# Patient Record
Sex: FEMALE | Race: WHITE | NOT HISPANIC OR LATINO
[De-identification: names, ages, dates, MRNs, and addresses within clinical notes are randomized per-mention and may not be internally consistent; named-entity substitution may affect disease eponyms.]

---

## 2019-03-17 ENCOUNTER — TRANSCRIPTION ENCOUNTER (OUTPATIENT)
Age: 54
End: 2019-03-17

## 2020-10-06 ENCOUNTER — TRANSCRIPTION ENCOUNTER (OUTPATIENT)
Age: 55
End: 2020-10-06

## 2021-02-18 ENCOUNTER — TRANSCRIPTION ENCOUNTER (OUTPATIENT)
Age: 56
End: 2021-02-18

## 2021-02-19 ENCOUNTER — TRANSCRIPTION ENCOUNTER (OUTPATIENT)
Age: 56
End: 2021-02-19

## 2021-03-28 ENCOUNTER — APPOINTMENT (OUTPATIENT)
Dept: DISASTER EMERGENCY | Facility: OTHER | Age: 56
End: 2021-03-28
Payer: COMMERCIAL

## 2021-03-28 PROCEDURE — 0011A: CPT

## 2021-04-25 ENCOUNTER — APPOINTMENT (OUTPATIENT)
Dept: DISASTER EMERGENCY | Facility: OTHER | Age: 56
End: 2021-04-25
Payer: COMMERCIAL

## 2021-04-25 PROCEDURE — 0012A: CPT

## 2022-03-15 ENCOUNTER — OUTPATIENT (OUTPATIENT)
Dept: OUTPATIENT SERVICES | Facility: HOSPITAL | Age: 57
LOS: 1 days | End: 2022-03-15

## 2022-03-23 DIAGNOSIS — M53.3 SACROCOCCYGEAL DISORDERS, NOT ELSEWHERE CLASSIFIED: ICD-10-CM

## 2022-03-23 DIAGNOSIS — I10 ESSENTIAL (PRIMARY) HYPERTENSION: ICD-10-CM

## 2023-02-27 ENCOUNTER — NON-APPOINTMENT (OUTPATIENT)
Age: 58
End: 2023-02-27

## 2023-05-29 ENCOUNTER — NON-APPOINTMENT (OUTPATIENT)
Age: 58
End: 2023-05-29

## 2023-10-16 ENCOUNTER — EMERGENCY (EMERGENCY)
Facility: HOSPITAL | Age: 58
LOS: 1 days | Discharge: HOME | End: 2023-10-16
Attending: EMERGENCY MEDICINE
Payer: COMMERCIAL

## 2023-10-16 VITALS
RESPIRATION RATE: 14 BRPM | DIASTOLIC BLOOD PRESSURE: 98 MMHG | WEIGHT: 139.77 LBS | OXYGEN SATURATION: 99 % | TEMPERATURE: 98 F | HEART RATE: 94 BPM | SYSTOLIC BLOOD PRESSURE: 164 MMHG

## 2023-10-16 LAB
ALBUMIN SERPL ELPH-MCNC: 4.4 G/DL — SIGNIFICANT CHANGE UP (ref 3.3–5.2)
ALBUMIN SERPL ELPH-MCNC: 4.4 G/DL — SIGNIFICANT CHANGE UP (ref 3.3–5.2)
ALP SERPL-CCNC: 57 U/L — SIGNIFICANT CHANGE UP (ref 40–120)
ALP SERPL-CCNC: 57 U/L — SIGNIFICANT CHANGE UP (ref 40–120)
ALT FLD-CCNC: 17 U/L — SIGNIFICANT CHANGE UP
ALT FLD-CCNC: 17 U/L — SIGNIFICANT CHANGE UP
ANION GAP SERPL CALC-SCNC: 13 MMOL/L — SIGNIFICANT CHANGE UP (ref 5–17)
ANION GAP SERPL CALC-SCNC: 13 MMOL/L — SIGNIFICANT CHANGE UP (ref 5–17)
AST SERPL-CCNC: 38 U/L — HIGH
AST SERPL-CCNC: 38 U/L — HIGH
BASOPHILS # BLD AUTO: 0.05 K/UL — SIGNIFICANT CHANGE UP (ref 0–0.2)
BASOPHILS # BLD AUTO: 0.05 K/UL — SIGNIFICANT CHANGE UP (ref 0–0.2)
BASOPHILS NFR BLD AUTO: 0.4 % — SIGNIFICANT CHANGE UP (ref 0–2)
BASOPHILS NFR BLD AUTO: 0.4 % — SIGNIFICANT CHANGE UP (ref 0–2)
BILIRUB SERPL-MCNC: 0.6 MG/DL — SIGNIFICANT CHANGE UP (ref 0.4–2)
BILIRUB SERPL-MCNC: 0.6 MG/DL — SIGNIFICANT CHANGE UP (ref 0.4–2)
BUN SERPL-MCNC: 8 MG/DL — SIGNIFICANT CHANGE UP (ref 8–20)
BUN SERPL-MCNC: 8 MG/DL — SIGNIFICANT CHANGE UP (ref 8–20)
CALCIUM SERPL-MCNC: 9.2 MG/DL — SIGNIFICANT CHANGE UP (ref 8.4–10.5)
CALCIUM SERPL-MCNC: 9.2 MG/DL — SIGNIFICANT CHANGE UP (ref 8.4–10.5)
CHLORIDE SERPL-SCNC: 102 MMOL/L — SIGNIFICANT CHANGE UP (ref 96–108)
CHLORIDE SERPL-SCNC: 102 MMOL/L — SIGNIFICANT CHANGE UP (ref 96–108)
CO2 SERPL-SCNC: 23 MMOL/L — SIGNIFICANT CHANGE UP (ref 22–29)
CO2 SERPL-SCNC: 23 MMOL/L — SIGNIFICANT CHANGE UP (ref 22–29)
CREAT SERPL-MCNC: 0.8 MG/DL — SIGNIFICANT CHANGE UP (ref 0.5–1.3)
CREAT SERPL-MCNC: 0.8 MG/DL — SIGNIFICANT CHANGE UP (ref 0.5–1.3)
EGFR: 85 ML/MIN/1.73M2 — SIGNIFICANT CHANGE UP
EGFR: 85 ML/MIN/1.73M2 — SIGNIFICANT CHANGE UP
EOSINOPHIL # BLD AUTO: 0.06 K/UL — SIGNIFICANT CHANGE UP (ref 0–0.5)
EOSINOPHIL # BLD AUTO: 0.06 K/UL — SIGNIFICANT CHANGE UP (ref 0–0.5)
EOSINOPHIL NFR BLD AUTO: 0.5 % — SIGNIFICANT CHANGE UP (ref 0–6)
EOSINOPHIL NFR BLD AUTO: 0.5 % — SIGNIFICANT CHANGE UP (ref 0–6)
GLUCOSE SERPL-MCNC: 93 MG/DL — SIGNIFICANT CHANGE UP (ref 70–99)
GLUCOSE SERPL-MCNC: 93 MG/DL — SIGNIFICANT CHANGE UP (ref 70–99)
HCT VFR BLD CALC: 40.9 % — SIGNIFICANT CHANGE UP (ref 34.5–45)
HCT VFR BLD CALC: 40.9 % — SIGNIFICANT CHANGE UP (ref 34.5–45)
HGB BLD-MCNC: 13.3 G/DL — SIGNIFICANT CHANGE UP (ref 11.5–15.5)
HGB BLD-MCNC: 13.3 G/DL — SIGNIFICANT CHANGE UP (ref 11.5–15.5)
IMM GRANULOCYTES NFR BLD AUTO: 0.3 % — SIGNIFICANT CHANGE UP (ref 0–0.9)
IMM GRANULOCYTES NFR BLD AUTO: 0.3 % — SIGNIFICANT CHANGE UP (ref 0–0.9)
LYMPHOCYTES # BLD AUTO: 1.89 K/UL — SIGNIFICANT CHANGE UP (ref 1–3.3)
LYMPHOCYTES # BLD AUTO: 1.89 K/UL — SIGNIFICANT CHANGE UP (ref 1–3.3)
LYMPHOCYTES # BLD AUTO: 14.2 % — SIGNIFICANT CHANGE UP (ref 13–44)
LYMPHOCYTES # BLD AUTO: 14.2 % — SIGNIFICANT CHANGE UP (ref 13–44)
MCHC RBC-ENTMCNC: 29.6 PG — SIGNIFICANT CHANGE UP (ref 27–34)
MCHC RBC-ENTMCNC: 29.6 PG — SIGNIFICANT CHANGE UP (ref 27–34)
MCHC RBC-ENTMCNC: 32.5 GM/DL — SIGNIFICANT CHANGE UP (ref 32–36)
MCHC RBC-ENTMCNC: 32.5 GM/DL — SIGNIFICANT CHANGE UP (ref 32–36)
MCV RBC AUTO: 91.1 FL — SIGNIFICANT CHANGE UP (ref 80–100)
MCV RBC AUTO: 91.1 FL — SIGNIFICANT CHANGE UP (ref 80–100)
MONOCYTES # BLD AUTO: 0.8 K/UL — SIGNIFICANT CHANGE UP (ref 0–0.9)
MONOCYTES # BLD AUTO: 0.8 K/UL — SIGNIFICANT CHANGE UP (ref 0–0.9)
MONOCYTES NFR BLD AUTO: 6 % — SIGNIFICANT CHANGE UP (ref 2–14)
MONOCYTES NFR BLD AUTO: 6 % — SIGNIFICANT CHANGE UP (ref 2–14)
NEUTROPHILS # BLD AUTO: 10.45 K/UL — HIGH (ref 1.8–7.4)
NEUTROPHILS # BLD AUTO: 10.45 K/UL — HIGH (ref 1.8–7.4)
NEUTROPHILS NFR BLD AUTO: 78.6 % — HIGH (ref 43–77)
NEUTROPHILS NFR BLD AUTO: 78.6 % — HIGH (ref 43–77)
PLATELET # BLD AUTO: 330 K/UL — SIGNIFICANT CHANGE UP (ref 150–400)
PLATELET # BLD AUTO: 330 K/UL — SIGNIFICANT CHANGE UP (ref 150–400)
POTASSIUM SERPL-MCNC: 4.7 MMOL/L — SIGNIFICANT CHANGE UP (ref 3.5–5.3)
POTASSIUM SERPL-MCNC: 4.7 MMOL/L — SIGNIFICANT CHANGE UP (ref 3.5–5.3)
POTASSIUM SERPL-SCNC: 4.7 MMOL/L — SIGNIFICANT CHANGE UP (ref 3.5–5.3)
POTASSIUM SERPL-SCNC: 4.7 MMOL/L — SIGNIFICANT CHANGE UP (ref 3.5–5.3)
PROT SERPL-MCNC: 7.3 G/DL — SIGNIFICANT CHANGE UP (ref 6.6–8.7)
PROT SERPL-MCNC: 7.3 G/DL — SIGNIFICANT CHANGE UP (ref 6.6–8.7)
RBC # BLD: 4.49 M/UL — SIGNIFICANT CHANGE UP (ref 3.8–5.2)
RBC # BLD: 4.49 M/UL — SIGNIFICANT CHANGE UP (ref 3.8–5.2)
RBC # FLD: 12.5 % — SIGNIFICANT CHANGE UP (ref 10.3–14.5)
RBC # FLD: 12.5 % — SIGNIFICANT CHANGE UP (ref 10.3–14.5)
SODIUM SERPL-SCNC: 138 MMOL/L — SIGNIFICANT CHANGE UP (ref 135–145)
SODIUM SERPL-SCNC: 138 MMOL/L — SIGNIFICANT CHANGE UP (ref 135–145)
WBC # BLD: 13.29 K/UL — HIGH (ref 3.8–10.5)
WBC # BLD: 13.29 K/UL — HIGH (ref 3.8–10.5)
WBC # FLD AUTO: 13.29 K/UL — HIGH (ref 3.8–10.5)
WBC # FLD AUTO: 13.29 K/UL — HIGH (ref 3.8–10.5)

## 2023-10-16 PROCEDURE — 74177 CT ABD & PELVIS W/CONTRAST: CPT | Mod: MA

## 2023-10-16 PROCEDURE — 99285 EMERGENCY DEPT VISIT HI MDM: CPT

## 2023-10-16 PROCEDURE — 74177 CT ABD & PELVIS W/CONTRAST: CPT | Mod: 26,MA

## 2023-10-16 PROCEDURE — 96374 THER/PROPH/DIAG INJ IV PUSH: CPT | Mod: XU

## 2023-10-16 PROCEDURE — 99284 EMERGENCY DEPT VISIT MOD MDM: CPT | Mod: 25

## 2023-10-16 PROCEDURE — 96375 TX/PRO/DX INJ NEW DRUG ADDON: CPT

## 2023-10-16 PROCEDURE — 80053 COMPREHEN METABOLIC PANEL: CPT

## 2023-10-16 PROCEDURE — 36415 COLL VENOUS BLD VENIPUNCTURE: CPT

## 2023-10-16 PROCEDURE — 85025 COMPLETE CBC W/AUTO DIFF WBC: CPT

## 2023-10-16 RX ORDER — HYDROMORPHONE HYDROCHLORIDE 2 MG/ML
1 INJECTION INTRAMUSCULAR; INTRAVENOUS; SUBCUTANEOUS ONCE
Refills: 0 | Status: DISCONTINUED | OUTPATIENT
Start: 2023-10-16 | End: 2023-10-16

## 2023-10-16 RX ORDER — CEFTRIAXONE 500 MG/1
1000 INJECTION, POWDER, FOR SOLUTION INTRAMUSCULAR; INTRAVENOUS ONCE
Refills: 0 | Status: COMPLETED | OUTPATIENT
Start: 2023-10-16 | End: 2023-10-16

## 2023-10-16 RX ORDER — ONDANSETRON 8 MG/1
4 TABLET, FILM COATED ORAL ONCE
Refills: 0 | Status: COMPLETED | OUTPATIENT
Start: 2023-10-16 | End: 2023-10-16

## 2023-10-16 RX ORDER — CEFTRIAXONE 500 MG/1
1000 INJECTION, POWDER, FOR SOLUTION INTRAMUSCULAR; INTRAVENOUS ONCE
Refills: 0 | Status: DISCONTINUED | OUTPATIENT
Start: 2023-10-16 | End: 2023-10-16

## 2023-10-16 RX ORDER — SODIUM CHLORIDE 9 MG/ML
1000 INJECTION INTRAMUSCULAR; INTRAVENOUS; SUBCUTANEOUS ONCE
Refills: 0 | Status: COMPLETED | OUTPATIENT
Start: 2023-10-16 | End: 2023-10-16

## 2023-10-16 RX ADMIN — SODIUM CHLORIDE 1000 MILLILITER(S): 9 INJECTION INTRAMUSCULAR; INTRAVENOUS; SUBCUTANEOUS at 20:49

## 2023-10-16 RX ADMIN — HYDROMORPHONE HYDROCHLORIDE 1 MILLIGRAM(S): 2 INJECTION INTRAMUSCULAR; INTRAVENOUS; SUBCUTANEOUS at 20:48

## 2023-10-16 RX ADMIN — CEFTRIAXONE 1000 MILLIGRAM(S): 500 INJECTION, POWDER, FOR SOLUTION INTRAMUSCULAR; INTRAVENOUS at 20:49

## 2023-10-16 RX ADMIN — ONDANSETRON 4 MILLIGRAM(S): 8 TABLET, FILM COATED ORAL at 20:48

## 2023-10-16 NOTE — ED ADULT TRIAGE NOTE - DIRECT TO ROOM CARE INITIATED:
Pt upset about the plan of care and why we are doing and IV and insulin. Explained the why to patient, who allowed me to attempt and IV one. After the failed attempt, requesting to speak with MD because she did not present because of her blood sugars and would like to only be checked for her possible UTI only.  Informed MD.     Arabella Finley RN  09/05/23 [id number] 16-Oct-2023 18:29

## 2023-10-16 NOTE — ED ADULT NURSE REASSESSMENT NOTE - NS ED NURSE REASSESS COMMENT FT1
Report received from EDWARD Russell.  Patient awaiting CT.  VS stable, respirations even and unlabored.  Will continue to monitor.

## 2023-10-16 NOTE — ED ADULT NURSE NOTE - OBJECTIVE STATEMENT
pt received in [facility]. Patient A&Ox4 complaining of l sided abdominal pain x 1 day.  PMHx of diverticulitis and states this feels the same. Denies N/V/D, fever, chills. NAD noted, respirations even and unlabored.  Safety precautions in place.  Plan of care explained, pt verbalized understanding.

## 2023-10-16 NOTE — ED STATDOCS - OBJECTIVE STATEMENT
57 y/o female with PMHx of diverticulitis, spinal fusion, hysterectomy presents to the ED c/o 1 day of LLQ abdominal pain which she states feels similar to previous episodes of diverticulitis in the past. Pt has not yet had BM today but does not feel constipated.   Denies f/c/n/v/cp/sob/palpitations/cough/rash/headache/dizziness/d/c/dysuria/hematuria    GI: Suparijessi 57 y/o female with PMHx of diverticulitis, spinal fusion, hysterectomy presents to the ED c/o 1 day of LLQ abdominal pain which she states feels similar to previous episodes of diverticulitis in the past. Pt has not yet had BM today but does not feel constipated.   Denies f/c/n/v/cp/sob/palpitations/cough/rash/headache/dizziness/d/c/dysuria/hematuria

## 2023-10-16 NOTE — ED STATDOCS - CLINICAL SUMMARY MEDICAL DECISION MAKING FREE TEXT BOX
59 y/o female with PMHx of diverticulitis presents to the ED c/o 1 day of LLQ abdominal pain which she states feels similar to previous episodes of diverticulitis in the past. Pt with most likely diverticulitis, will check labs, CT, pain control 57 y/o female with PMHx of diverticulitis presents to the ED c/o 1 day of LLQ abdominal pain which she states feels similar to previous episodes of diverticulitis in the past. Pt with most likely diverticulitis, will check labs, CT, pain control    JORGE A Nevarez 0111: CT with uncomplicated diverticulitis. Aware of adnexal cyst. Patient had Cipro/Flagyl sent to pharmacy by outpatient MD, but would rather take Augmentin. Medically stable for discharge. 59 y/o female with PMHx of diverticulitis presents to the ED c/o 1 day of LLQ abdominal pain which she states feels similar to previous episodes of diverticulitis in the past. started on cipro anf lagyl by her gi doctor but has not started the medications/picked them up yet. Pt with most likely diverticulitis, will check labs, CT, pain control    JORGE A Nevarez 0111: CT with uncomplicated diverticulitis. Aware of adnexal cyst. Patient had Cipro/Flagyl sent to pharmacy by outpatient MD, but would rather take Augmentin. Medically stable for discharge.

## 2023-10-16 NOTE — ED STATDOCS - NSFOLLOWUPINSTRUCTIONS_ED_ALL_ED_FT
- Prescription sent to pharmacy.    - Recommend over the counter Probiotic.   - Ibuprofen 600mg every 6 hours as needed for pain.  - Acetaminophen 650mg every 6 hours as needed for pain.   - Please call today to schedule follow up appointment with your gastroenterologist.   - Please bring all documentation from your ED visit to any related future follow up appointment.  - Please call to schedule follow up appointment with your primary care physician within 24-48 hours.  - Please seek immediate medical attention or return to the ED for any new/worsening, signs/symptoms, or concerns.    Feel better!       Diverticulitis       Diverticulitis is infection or inflammation of small pouches (diverticula) in the colon that form due to a condition called diverticulosis. Diverticula can trap stool (feces) and bacteria, causing infection and inflammation.    Diverticulitis may cause severe stomach pain and diarrhea. It may lead to tissue damage in the colon that causes bleeding or blockage. The diverticula may also burst (rupture) and cause infected stool to enter other areas of the abdomen.    What are the causes?  This condition is caused by stool becoming trapped in the diverticula, which allows bacteria to grow in the diverticula. This leads to inflammation and infection.    What increases the risk?  You are more likely to develop this condition if you have diverticulosis. The risk increases if you:    Are overweight or obese.  Do not get enough exercise.  Drink alcohol.  Use tobacco products.  Eat a diet that has a lot of red meat such as beef, pork, or lamb.  Eat a diet that does not include enough fiber. High-fiber foods include fruits, vegetables, beans, nuts, and whole grains.  Are over 40 years of age.    What are the signs or symptoms?  Symptoms of this condition may include:    Pain and tenderness in the abdomen. The pain is normally located on the left side of the abdomen, but it may occur in other areas.  Fever and chills.  Nausea.  Vomiting.  Cramping.  Bloating.  Changes in bowel routines.  Blood in your stool.    How is this diagnosed?  This condition is diagnosed based on:    Your medical history.  A physical exam.  Tests to make sure there is nothing else causing your condition. These tests may include:    Blood tests.  Urine tests.  CT scan of the abdomen.    How is this treated?  Most cases of this condition are mild and can be treated at home. Treatment may include:    Taking over-the-counter pain medicines.  Following a clear liquid diet.  Taking antibiotic medicines by mouth.  Resting.    More severe cases may need to be treated at a hospital. Treatment may include:    Not eating or drinking.  Taking prescription pain medicine.  Receiving antibiotic medicines through an IV.  Receiving fluids and nutrition through an IV.  Surgery.    When your condition is under control, your health care provider may recommend that you have a colonoscopy. This is an exam to look at the entire large intestine. During the exam, a lubricated, bendable tube is inserted into the anus and then passed into the rectum, colon, and other parts of the large intestine. A colonoscopy can show how severe your diverticula are and whether something else may be causing your symptoms.    Follow these instructions at home:      Medicines    Take over-the-counter and prescription medicines only as told by your health care provider. These include fiber supplements, probiotics, and stool softeners.  If you were prescribed an antibiotic medicine, take it as told by your health care provider. Do not stop taking the antibiotic even if you start to feel better.  Ask your health care provider if the medicine prescribed to you requires you to avoid driving or using machinery.        Eating and drinking     Follow a full liquid diet or another diet as directed by your health care provider.  After your symptoms improve, your health care provider may tell you to change your diet. He or she may recommend that you eat a diet that contains at least 25 grams (25 g) of fiber daily. Fiber makes it easier to pass stool. Healthy sources of fiber include:    Berries. One cup contains 4–8 grams of fiber.  Beans or lentils. One-half cup contains 5–8 grams of fiber.  Green vegetables. One cup contains 4 grams of fiber.  Avoid eating red meat.        General instructions    Do not use any products that contain nicotine or tobacco, such as cigarettes, e-cigarettes, and chewing tobacco. If you need help quitting, ask your health care provider.  Exercise for at least 30 minutes, 3 times each week. You should exercise hard enough to raise your heart rate and break a sweat.  Keep all follow-up visits as told by your health care provider. This is important. You may need to have a colonoscopy.    Contact a health care provider if:  Your pain does not improve.  Your bowel movements do not return to normal.    Get help right away if:  Your pain gets worse.  Your symptoms do not get better with treatment.  Your symptoms suddenly get worse.  You have a fever.  You vomit more than one time.  You have stools that are bloody, black, or tarry.    Summary  Diverticulitis is infection or inflammation of small pouches (diverticula) in the colon that form due to a condition called diverticulosis. Diverticula can trap stool (feces) and bacteria, causing infection and inflammation.  You are at higher risk for this condition if you have diverticulosis and you eat a diet that does not include enough fiber.  Most cases of this condition are mild and can be treated at home. More severe cases may need to be treated at a hospital.  When your condition is under control, your health care provider may recommend that you have an exam called a colonoscopy. This exam can show how severe your diverticula are and whether something else may be causing your symptoms.  Keep all follow-up visits as told by your health care provider. This is important.    ADDITIONAL NOTES AND INSTRUCTIONS    Please follow up with your Primary MD in 24-48 hr.  Seek immediate medical care for any new/worsening signs or symptoms.

## 2023-10-16 NOTE — ED STATDOCS - PATIENT PORTAL LINK FT
You can access the FollowMyHealth Patient Portal offered by [facility] by registering at the following website: [email or web address]. By joining [employer]’s FollowMyHealth portal, you will also be able to view your health information using other applications (apps) compatible with our system.

## 2023-10-16 NOTE — ED STATDOCS - PROGRESS NOTE DETAILS
JORGE A Nevarez: CT with uncomplicated diverticulitis. Aware of adnexal cyst. Patient had Cipro/Flagyl sent to pharmacy by outpatient MD, but would rather take Augmentin. Medically stable for discharge. JORGE A Nevarez: Patient evaluated by intake physician. HPI/ROS/PE as noted above. Will follow up plan per intake physician and continue to assess patient.

## 2023-10-16 NOTE — ED ADULT NURSE NOTE - NSFALLUNIVINTERV_ED_ALL_ED
Bed/Stretcher in lowest position, wheels locked, appropriate side rails in place/Call bell, personal items and telephone in reach/Instruct patient to call for assistance before getting out of bed/chair/stretcher/Non-slip footwear applied when patient is off stretcher/[place] to call system/Physically safe environment - no spills, clutter or unnecessary equipment/Purposeful proactive rounding/Room/bathroom lighting operational, light cord in reach

## 2023-10-17 RX ORDER — IBUPROFEN 200 MG
1 TABLET ORAL
Qty: 28 | Refills: 0
Start: 2023-10-17 | End: 2023-10-23

## 2024-02-20 PROBLEM — Z00.00 ENCOUNTER FOR PREVENTIVE HEALTH EXAMINATION: Status: ACTIVE | Noted: 2024-02-20

## 2024-02-21 ENCOUNTER — APPOINTMENT (OUTPATIENT)
Dept: VASCULAR SURGERY | Facility: CLINIC | Age: 59
End: 2024-02-21
Payer: COMMERCIAL

## 2024-02-21 VITALS
OXYGEN SATURATION: 100 % | DIASTOLIC BLOOD PRESSURE: 50 MMHG | HEART RATE: 76 BPM | HEIGHT: 65 IN | WEIGHT: 133 LBS | BODY MASS INDEX: 22.16 KG/M2 | SYSTOLIC BLOOD PRESSURE: 144 MMHG

## 2024-02-21 DIAGNOSIS — M79.605 PAIN IN RIGHT LEG: ICD-10-CM

## 2024-02-21 DIAGNOSIS — M79.671 PAIN IN RIGHT LEG: ICD-10-CM

## 2024-02-21 DIAGNOSIS — M79.672 PAIN IN RIGHT LEG: ICD-10-CM

## 2024-02-21 DIAGNOSIS — M79.604 PAIN IN RIGHT LEG: ICD-10-CM

## 2024-02-21 PROCEDURE — 93970 EXTREMITY STUDY: CPT

## 2024-02-21 PROCEDURE — 99203 OFFICE O/P NEW LOW 30 MIN: CPT

## 2024-02-21 NOTE — REVIEW OF SYSTEMS
[Pelvic Pain] : pelvic pain [Joint Stiffness] : joint stiffness [Limb Weakness] : limb weakness [Easy Bruising] : a tendency for easy bruising [Negative] : Genitourinary [Limb Swelling] : no limb swelling [Skin Lesions] : no skin lesions [Skin Wound] : no skin wound [Itching] : no itching [Confused] : no confusion [Difficulty Walking] : no difficulty walking [FreeTextEntry9] : chronic back pain

## 2024-02-21 NOTE — DATA REVIEWED
[FreeTextEntry1] : Bilateral Venous Duplex: no evidence of acute or chronic DVT  no superficial or deep venous reflux noted  no dilated varicosities  bilateral GSV/SSV competent 
None

## 2024-02-21 NOTE — ASSESSMENT
[FreeTextEntry1] : 59 year old female with h/o Hashimoto's, chronic back pain s/p multiple surgical interventions/interventional procedures with bilateral lower extremity leg cramping and hip/buttock symptoms that appear to be neuropathic/radicular in nature;  No evidence of venous or arterial pathology;  No symptoms consistent with or suggestive of underlying pelvic congestion syndrome  -recommend continued workup with pain management physician  -RTC PRN

## 2024-02-21 NOTE — PHYSICAL EXAM
[2+] : left 2+ [JVD] : no jugular venous distention  [Abdominal Aorta] : Normal abdominal aorta [Varicose Veins Of Lower Extremities] : not present [] : not present [Purpura] : no purpura  [Petechiae] : no petechiae [Skin Ulcer] : no ulcer [Skin Induration] : no induration [Alert] : alert [Oriented to Person] : oriented to person [Oriented to Place] : oriented to place [Oriented to Time] : oriented to time [Calm] : calm [de-identified] : Alert/Oriented NAD  [FreeTextEntry1] : LE warm, well perfused  brisk cap refill < 3 seconds throughout  few reticular veins noted no dilated varicosities  no superficial thrombophlebitis no evidence of skin fibrosis/hemosiderin deposition  no dependent rubor/elevation pallor or tissue loss  no edema  motor/sensory intact  no evidence of atheroembolism/thromboembolism no dilated abdominal wall varices

## 2024-02-21 NOTE — HISTORY OF PRESENT ILLNESS
[FreeTextEntry1] : 59 year old G2 female with PMH of Hashimotos, Raynauds phenomenon, chronic back pain s/p spinal fusion x 2 and most recent radiofrequency ablation of the bilateral SI joints one month ago presents for vascular consultation.  Patient complains of chronic lower back pain and more recently since november a "buzzing" originating at the level of both hips with radiation into her buttocks/pelvic area.  She complains of restless legs / leg cramping that occurs with lying flat in bed prohibiting her from sleeping most nights.   Reports more recent RLE weakness that occurs upon rising from a seated position.  Venous duplex performed at pain management office as per patient revealed venous reflux in both lower extremities.    Denies h/o previous venous interventions, leg swelling, aching or heaviness with prolonged dependency.  .  States recent blood work was WNL without electrolyte abnormalities.  Denies claudication symptoms, rest pain or h/o thrombosis or known underlying thrombophilia.  +Family history of autoimmune disease. Mother + varicosities;

## 2024-07-15 ENCOUNTER — NON-APPOINTMENT (OUTPATIENT)
Age: 59
End: 2024-07-15

## 2024-08-22 ENCOUNTER — APPOINTMENT (OUTPATIENT)
Dept: CARDIOLOGY | Facility: CLINIC | Age: 59
End: 2024-08-22

## 2024-09-03 ENCOUNTER — OFFICE (OUTPATIENT)
Facility: LOCATION | Age: 59
Setting detail: OPHTHALMOLOGY
End: 2024-09-03
Payer: COMMERCIAL

## 2024-09-03 VITALS — HEIGHT: 55 IN

## 2024-09-03 DIAGNOSIS — H25.13: ICD-10-CM

## 2024-09-03 DIAGNOSIS — H40.013: ICD-10-CM

## 2024-09-03 DIAGNOSIS — H04.563: ICD-10-CM

## 2024-09-03 PROCEDURE — 92014 COMPRE OPH EXAM EST PT 1/>: CPT | Performed by: OPHTHALMOLOGY

## 2024-09-03 PROCEDURE — 92250 FUNDUS PHOTOGRAPHY W/I&R: CPT | Performed by: OPHTHALMOLOGY

## 2024-09-03 ASSESSMENT — LID EXAM ASSESSMENTS
OD_BLEPHARITIS: RUL 1+
OS_MEIBOMITIS: LUL 1+
OD_MEIBOMITIS: RUL 1+
OS_BLEPHARITIS: LUL 1+

## 2024-09-03 ASSESSMENT — CONFRONTATIONAL VISUAL FIELD TEST (CVF)
OD_FINDINGS: FULL
OS_FINDINGS: FULL

## 2024-09-10 ENCOUNTER — OFFICE (OUTPATIENT)
Facility: LOCATION | Age: 59
Setting detail: OPHTHALMOLOGY
End: 2024-09-10
Payer: COMMERCIAL

## 2024-09-10 DIAGNOSIS — H04.563: ICD-10-CM

## 2024-09-10 PROCEDURE — 68810 PROBE NASOLACRIMAL DUCT: CPT | Mod: 50 | Performed by: OPHTHALMOLOGY

## 2024-09-10 ASSESSMENT — LID EXAM ASSESSMENTS
OS_BLEPHARITIS: LUL 1+
OD_MEIBOMITIS: RUL 1+
OD_BLEPHARITIS: RUL 1+
OS_MEIBOMITIS: LUL 1+

## 2024-09-10 ASSESSMENT — CONFRONTATIONAL VISUAL FIELD TEST (CVF)
OD_FINDINGS: FULL
OS_FINDINGS: FULL

## 2024-09-17 ENCOUNTER — RX ONLY (RX ONLY)
Age: 59
End: 2024-09-17

## 2024-09-17 ENCOUNTER — OFFICE (OUTPATIENT)
Dept: URBAN - METROPOLITAN AREA CLINIC 100 | Facility: CLINIC | Age: 59
Setting detail: OPHTHALMOLOGY
End: 2024-09-17
Payer: COMMERCIAL

## 2024-09-17 DIAGNOSIS — H04.552: ICD-10-CM

## 2024-09-17 DIAGNOSIS — H04.212: ICD-10-CM

## 2024-09-17 PROBLEM — H40.013 GLAUCOMA SUSPECT, LOW RISK; BOTH EYES: Status: ACTIVE | Noted: 2024-09-03

## 2024-09-17 PROBLEM — H25.13 CATARACT SENILE NUCLEAR SCLEROSIS; BOTH EYES: Status: ACTIVE | Noted: 2024-09-03

## 2024-09-17 PROBLEM — H43.813 VITREOUS DETACHMENT; BOTH EYES: Status: ACTIVE | Noted: 2024-09-03

## 2024-09-17 PROBLEM — H16.223 DRY EYE SYNDROME K SICCA; BOTH EYES: Status: ACTIVE | Noted: 2024-09-03

## 2024-09-17 PROCEDURE — 99214 OFFICE O/P EST MOD 30 MIN: CPT | Mod: 24 | Performed by: OPHTHALMOLOGY

## 2024-09-17 ASSESSMENT — CONFRONTATIONAL VISUAL FIELD TEST (CVF)
OD_FINDINGS: FULL
OS_FINDINGS: FULL

## 2024-09-17 ASSESSMENT — LID EXAM ASSESSMENTS
OS_BLEPHARITIS: LUL 1+
OD_BLEPHARITIS: RUL 1+
OD_MEIBOMITIS: RUL 1+
OS_MEIBOMITIS: LUL 1+

## 2024-10-27 ENCOUNTER — OFFICE (OUTPATIENT)
Dept: URBAN - METROPOLITAN AREA CLINIC 100 | Facility: CLINIC | Age: 59
Setting detail: OPHTHALMOLOGY
End: 2024-10-27
Payer: COMMERCIAL

## 2024-10-27 DIAGNOSIS — H04.212: ICD-10-CM

## 2024-10-27 DIAGNOSIS — H04.552: ICD-10-CM

## 2024-10-27 PROCEDURE — 31231 NASAL ENDOSCOPY DX: CPT | Performed by: OPHTHALMOLOGY

## 2024-10-27 ASSESSMENT — PUNCTA - ASSESSMENT
OS_PUNCTA: SMALL
OD_PUNCTA: SMALL

## 2024-10-27 ASSESSMENT — LID EXAM ASSESSMENTS
OS_MEIBOMITIS: LUL 1+
OS_BLEPHARITIS: LUL 1+
OD_BLEPHARITIS: RUL 1+
OD_MEIBOMITIS: RUL 1+

## 2024-10-27 ASSESSMENT — TEAR BREAK UP TIME (TBUT)
OS_TBUT: T
OD_TBUT: T

## 2024-10-27 ASSESSMENT — CONFRONTATIONAL VISUAL FIELD TEST (CVF)
OD_FINDINGS: FULL
OS_FINDINGS: FULL

## 2024-10-28 ASSESSMENT — REFRACTION_AUTOREFRACTION
OS_CYLINDER: -0.50
OD_CYLINDER: -0.75
OS_AXIS: 106
OD_SPHERE: -2.75
OS_SPHERE: -3.75
OD_AXIS: 001

## 2024-10-28 ASSESSMENT — KERATOMETRY
OD_K2POWER_DIOPTERS: 45.00
OS_K1POWER_DIOPTERS: 44.50
OD_K1POWER_DIOPTERS: 43.75
OD_AXISANGLE_DEGREES: 090
OS_K2POWER_DIOPTERS: 45.00
OS_AXISANGLE_DEGREES: 070

## 2024-10-28 ASSESSMENT — VISUAL ACUITY
OD_BCVA: 20/125
OS_BCVA: 20/25-3

## 2024-10-30 ENCOUNTER — ASC (OUTPATIENT)
Dept: URBAN - METROPOLITAN AREA SURGERY 9 | Facility: SURGERY | Age: 59
Setting detail: OPHTHALMOLOGY
End: 2024-10-30
Payer: COMMERCIAL

## 2024-10-30 DIAGNOSIS — H04.212: ICD-10-CM

## 2024-10-30 DIAGNOSIS — H04.552: ICD-10-CM

## 2024-10-30 PROCEDURE — 68815 PROBE NASOLACRIMAL DUCT: CPT | Mod: LT | Performed by: OPHTHALMOLOGY

## 2024-10-30 PROCEDURE — 68720 CREATE TEAR SAC DRAIN: CPT | Mod: LT | Performed by: OPHTHALMOLOGY

## 2024-10-30 PROCEDURE — 31200 REMOVAL OF ETHMOID SINUS: CPT | Mod: LT | Performed by: OPHTHALMOLOGY

## 2024-11-12 ENCOUNTER — OFFICE (OUTPATIENT)
Dept: URBAN - METROPOLITAN AREA CLINIC 100 | Facility: CLINIC | Age: 59
Setting detail: OPHTHALMOLOGY
End: 2024-11-12
Payer: COMMERCIAL

## 2024-11-12 DIAGNOSIS — H04.212: ICD-10-CM

## 2024-11-12 DIAGNOSIS — H04.552: ICD-10-CM

## 2024-11-12 PROCEDURE — 99024 POSTOP FOLLOW-UP VISIT: CPT | Performed by: OPHTHALMOLOGY

## 2024-11-12 ASSESSMENT — REFRACTION_AUTOREFRACTION
OD_AXIS: 001
OS_SPHERE: -3.75
OD_CYLINDER: -0.75
OD_SPHERE: -2.75
OS_AXIS: 106
OS_CYLINDER: -0.50

## 2024-11-12 ASSESSMENT — KERATOMETRY
OS_K2POWER_DIOPTERS: 45.00
OD_K1POWER_DIOPTERS: 43.75
OD_AXISANGLE_DEGREES: 090
OD_K2POWER_DIOPTERS: 45.00
OS_K1POWER_DIOPTERS: 44.50
OS_AXISANGLE_DEGREES: 070

## 2024-11-12 ASSESSMENT — CONFRONTATIONAL VISUAL FIELD TEST (CVF)
OS_FINDINGS: FULL
OD_FINDINGS: FULL

## 2024-11-12 ASSESSMENT — PUNCTA - ASSESSMENT
OD_PUNCTA: SMALL
OS_PUNCTA: SMALL

## 2024-11-12 ASSESSMENT — VISUAL ACUITY
OD_BCVA: 20/70
OS_BCVA: 20/50

## 2024-11-12 ASSESSMENT — TEAR BREAK UP TIME (TBUT)
OS_TBUT: T
OD_TBUT: T

## 2024-11-12 ASSESSMENT — LID EXAM ASSESSMENTS
OD_BLEPHARITIS: RUL 1+
OS_MEIBOMITIS: LUL 1+
OD_MEIBOMITIS: RUL 1+
OS_BLEPHARITIS: LUL 1+

## 2024-11-27 ENCOUNTER — OFFICE (OUTPATIENT)
Dept: URBAN - METROPOLITAN AREA CLINIC 104 | Facility: CLINIC | Age: 59
Setting detail: OPHTHALMOLOGY
End: 2024-11-27
Payer: COMMERCIAL

## 2024-11-27 DIAGNOSIS — H04.552: ICD-10-CM

## 2024-11-27 PROCEDURE — 99024 POSTOP FOLLOW-UP VISIT: CPT | Performed by: OPHTHALMOLOGY

## 2024-11-27 ASSESSMENT — PUNCTA - ASSESSMENT
OS_PUNCTA: SMALL
OD_PUNCTA: SMALL

## 2024-11-27 ASSESSMENT — TEAR BREAK UP TIME (TBUT)
OS_TBUT: T
OD_TBUT: T

## 2024-11-27 ASSESSMENT — LID EXAM ASSESSMENTS
OD_BLEPHARITIS: RUL 1+
OS_MEIBOMITIS: LUL 1+
OD_MEIBOMITIS: RUL 1+
OS_BLEPHARITIS: LUL 1+

## 2024-11-27 ASSESSMENT — VISUAL ACUITY
OS_BCVA: 20/30+2
OD_BCVA: 20/40+1

## 2024-12-28 ENCOUNTER — OFFICE (OUTPATIENT)
Dept: URBAN - METROPOLITAN AREA CLINIC 100 | Facility: CLINIC | Age: 59
Setting detail: OPHTHALMOLOGY
End: 2024-12-28
Payer: COMMERCIAL

## 2024-12-28 DIAGNOSIS — H04.552: ICD-10-CM

## 2024-12-28 PROCEDURE — 68840 EXPLORE/IRRIGATE TEAR DUCTS: CPT | Mod: 78,LT | Performed by: OPHTHALMOLOGY

## 2024-12-28 ASSESSMENT — LID EXAM ASSESSMENTS
OD_BLEPHARITIS: RUL 1+
OS_MEIBOMITIS: LUL 1+
OS_BLEPHARITIS: LUL 1+
OD_MEIBOMITIS: RUL 1+

## 2024-12-28 ASSESSMENT — PUNCTA - ASSESSMENT: OD_PUNCTA: SMALL

## 2024-12-28 ASSESSMENT — VISUAL ACUITY
OS_BCVA: 20/25
OD_BCVA: 20/60+2

## 2024-12-28 ASSESSMENT — CONFRONTATIONAL VISUAL FIELD TEST (CVF)
OS_FINDINGS: FULL
OD_FINDINGS: FULL

## 2024-12-28 ASSESSMENT — TEAR BREAK UP TIME (TBUT)
OS_TBUT: T
OD_TBUT: T

## 2025-05-23 ENCOUNTER — APPOINTMENT (OUTPATIENT)
Dept: PLASTIC SURGERY | Facility: CLINIC | Age: 60
End: 2025-05-23